# Patient Record
Sex: FEMALE
[De-identification: names, ages, dates, MRNs, and addresses within clinical notes are randomized per-mention and may not be internally consistent; named-entity substitution may affect disease eponyms.]

---

## 2018-09-25 PROBLEM — E11.9 TYPE 2 DIABETES MELLITUS WITHOUT COMPLICATION, WITH LONG-TERM CURRENT USE OF INSULIN (HCC): Status: ACTIVE | Noted: 2017-11-26

## 2018-09-25 PROBLEM — G47.33 OSA (OBSTRUCTIVE SLEEP APNEA): Status: ACTIVE | Noted: 2017-02-03

## 2018-09-25 PROBLEM — G47.26 SHIFT WORK SLEEP DISORDER: Status: ACTIVE | Noted: 2018-09-25

## 2018-09-25 PROBLEM — E11.29 TYPE II OR UNSPECIFIED TYPE DIABETES MELLITUS WITH RENAL MANIFESTATIONS, UNCONTROLLED(250.42): Status: ACTIVE | Noted: 2017-11-26

## 2018-09-25 PROBLEM — Z79.4 TYPE 2 DIABETES MELLITUS WITHOUT COMPLICATION, WITH LONG-TERM CURRENT USE OF INSULIN (HCC): Status: ACTIVE | Noted: 2017-11-26

## 2018-09-25 PROBLEM — E11.65 TYPE II OR UNSPECIFIED TYPE DIABETES MELLITUS WITH RENAL MANIFESTATIONS, UNCONTROLLED(250.42): Status: ACTIVE | Noted: 2017-11-26

## 2018-10-22 PROBLEM — G47.31 CENTRAL SLEEP APNEA: Status: ACTIVE | Noted: 2017-02-03

## 2019-01-01 ENCOUNTER — EXTERNAL RECORD (OUTPATIENT)
Dept: HEALTH INFORMATION MANAGEMENT | Facility: OTHER | Age: 59
End: 2019-01-01

## 2019-02-19 ENCOUNTER — OFFICE VISIT (OUTPATIENT)
Dept: SLEEP CENTER | Facility: HOSPITAL | Age: 59
End: 2019-02-19
Attending: FAMILY MEDICINE
Payer: MEDICAID

## 2019-02-19 DIAGNOSIS — G47.33 OSA (OBSTRUCTIVE SLEEP APNEA): ICD-10-CM

## 2019-02-19 PROCEDURE — 95810 POLYSOM 6/> YRS 4/> PARAM: CPT

## 2019-02-25 NOTE — PROCEDURES
[address]       Accredited by the [facility] ([facility])    PATIENT'S NAME:        [email or web address]  ATTENDING PHYSICIAN:   Glen Sue M.D.   REFERRING PHYSICIAN:   Dr. Emi Gutierrez compared to expected norms. Sleep onset latency was decreased at 5 minutes. Wake after sleep onset was slightly increased at 46.5 minutes. Sleep was fragmented by repetitive arousals due to respiratory events.   The amount of stage 1, or light sleep, was

## 2019-03-26 PROBLEM — M19.90 ARTHRITIS: Status: ACTIVE | Noted: 2019-03-26

## 2019-03-26 PROBLEM — F17.219 CIGARETTE NICOTINE DEPENDENCE WITH NICOTINE-INDUCED DISORDER: Status: ACTIVE | Noted: 2019-03-26

## 2020-03-31 PROBLEM — I63.9 CVA (CEREBRAL VASCULAR ACCIDENT) (HCC): Status: ACTIVE | Noted: 2020-03-27

## 2020-03-31 PROBLEM — E83.42 HYPOMAGNESEMIA: Status: ACTIVE | Noted: 2020-03-28

## 2020-05-28 RX ORDER — AZITHROMYCIN 250 MG/1
TABLET, FILM COATED ORAL
Qty: 6 TABLET | Refills: 0 | OUTPATIENT
Start: 2020-05-28

## 2021-05-04 PROBLEM — R51.9 NONINTRACTABLE HEADACHE: Status: ACTIVE | Noted: 2020-05-26

## 2021-05-04 PROBLEM — I63.9 STROKE (HCC): Status: ACTIVE | Noted: 2020-03-27

## 2021-05-04 PROBLEM — Z86.73 HISTORY OF RECENT STROKE: Status: ACTIVE | Noted: 2020-04-21

## 2021-05-04 PROBLEM — D75.1 ERYTHROCYTOSIS: Status: ACTIVE | Noted: 2020-04-27

## 2021-05-04 PROBLEM — R29.818 TRANSIENT NEUROLOGICAL SYMPTOMS: Status: ACTIVE | Noted: 2020-04-20

## 2021-05-04 PROBLEM — R06.02 SHORTNESS OF BREATH: Status: ACTIVE | Noted: 2020-04-21

## 2021-05-04 PROBLEM — I25.10 ATHEROSCLEROSIS OF NATIVE CORONARY ARTERY OF NATIVE HEART WITHOUT ANGINA PECTORIS: Status: ACTIVE | Noted: 2020-04-21

## 2021-05-04 PROBLEM — J18.9 PNEUMONITIS: Status: ACTIVE | Noted: 2019-03-26

## 2021-05-04 PROBLEM — I50.42 CHRONIC COMBINED SYSTOLIC AND DIASTOLIC HEART FAILURE (HCC): Status: ACTIVE | Noted: 2020-04-22

## 2021-05-04 PROBLEM — Z72.0 TOBACCO USE: Status: ACTIVE | Noted: 2020-04-21

## 2021-05-04 PROBLEM — I25.5 ISCHEMIC CARDIOMYOPATHY: Status: ACTIVE | Noted: 2020-04-21

## 2021-05-04 PROBLEM — I63.231 CEREBRAL INFARCTION DUE TO INTERNAL CAROTID ARTERY OCCLUSION, RIGHT (HCC): Status: ACTIVE | Noted: 2020-04-21

## 2021-05-20 PROBLEM — R10.9 ABDOMINAL WALL PAIN: Status: ACTIVE | Noted: 2021-05-20

## 2021-05-20 PROBLEM — R63.5 WEIGHT GAIN: Status: ACTIVE | Noted: 2021-05-20

## 2021-09-09 PROBLEM — I63.9 RECURRENT STROKES (HCC): Status: ACTIVE | Noted: 2020-03-27

## 2021-09-09 PROBLEM — R56.9 SEIZURE (HCC): Status: ACTIVE | Noted: 2021-06-23

## 2021-09-09 PROBLEM — I65.21 RIGHT INTERNAL CAROTID OCCLUSION: Status: ACTIVE | Noted: 2021-06-23

## 2021-09-09 PROBLEM — F05 ACUTE CONFUSIONAL STATE: Status: ACTIVE | Noted: 2021-06-23

## 2021-09-09 PROBLEM — R20.0 ANESTHESIA OF SKIN: Status: ACTIVE | Noted: 2021-06-23

## 2021-09-09 PROBLEM — E53.8 LOW VITAMIN B12 LEVEL: Status: ACTIVE | Noted: 2021-06-23

## 2021-09-09 PROBLEM — G92.8 TOXIC METABOLIC ENCEPHALOPATHY: Status: ACTIVE | Noted: 2021-06-23
